# Patient Record
Sex: MALE
[De-identification: names, ages, dates, MRNs, and addresses within clinical notes are randomized per-mention and may not be internally consistent; named-entity substitution may affect disease eponyms.]

---

## 2017-07-25 ENCOUNTER — TRANSCRIPTION ENCOUNTER (OUTPATIENT)
Age: 52
End: 2017-07-25

## 2017-09-17 ENCOUNTER — TRANSCRIPTION ENCOUNTER (OUTPATIENT)
Age: 52
End: 2017-09-17

## 2017-10-13 ENCOUNTER — EMERGENCY (EMERGENCY)
Facility: HOSPITAL | Age: 52
LOS: 1 days | Discharge: LEFT BEFORE DISCHARGE | End: 2017-10-13
Attending: EMERGENCY MEDICINE
Payer: COMMERCIAL

## 2017-10-13 VITALS
DIASTOLIC BLOOD PRESSURE: 92 MMHG | SYSTOLIC BLOOD PRESSURE: 180 MMHG | HEIGHT: 65 IN | HEART RATE: 134 BPM | WEIGHT: 175.05 LBS

## 2017-10-13 LAB
ALBUMIN SERPL ELPH-MCNC: 4.4 G/DL — SIGNIFICANT CHANGE UP (ref 3.3–5.2)
ALP SERPL-CCNC: 70 U/L — SIGNIFICANT CHANGE UP (ref 40–120)
ALT FLD-CCNC: 13 U/L — SIGNIFICANT CHANGE UP
ANION GAP SERPL CALC-SCNC: 19 MMOL/L — HIGH (ref 5–17)
APAP SERPL-MCNC: <7.5 UG/ML — LOW (ref 10–26)
APTT BLD: 27.2 SEC — LOW (ref 27.5–37.4)
AST SERPL-CCNC: 20 U/L — SIGNIFICANT CHANGE UP
BASOPHILS # BLD AUTO: 0 K/UL — SIGNIFICANT CHANGE UP (ref 0–0.2)
BASOPHILS NFR BLD AUTO: 0.3 % — SIGNIFICANT CHANGE UP (ref 0–2)
BILIRUB SERPL-MCNC: 0.2 MG/DL — LOW (ref 0.4–2)
BLD GP AB SCN SERPL QL: SIGNIFICANT CHANGE UP
BUN SERPL-MCNC: 13 MG/DL — SIGNIFICANT CHANGE UP (ref 8–20)
CALCIUM SERPL-MCNC: 8.6 MG/DL — SIGNIFICANT CHANGE UP (ref 8.6–10.2)
CHLORIDE SERPL-SCNC: 101 MMOL/L — SIGNIFICANT CHANGE UP (ref 98–107)
CO2 SERPL-SCNC: 24 MMOL/L — SIGNIFICANT CHANGE UP (ref 22–29)
CREAT SERPL-MCNC: 0.76 MG/DL — SIGNIFICANT CHANGE UP (ref 0.5–1.3)
EOSINOPHIL # BLD AUTO: 0 K/UL — SIGNIFICANT CHANGE UP (ref 0–0.5)
EOSINOPHIL NFR BLD AUTO: 0.7 % — SIGNIFICANT CHANGE UP (ref 0–5)
ETHANOL SERPL-MCNC: 310 MG/DL — SIGNIFICANT CHANGE UP
GLUCOSE SERPL-MCNC: 132 MG/DL — HIGH (ref 70–115)
HCT VFR BLD CALC: 43.8 % — SIGNIFICANT CHANGE UP (ref 42–52)
HGB BLD-MCNC: 14.8 G/DL — SIGNIFICANT CHANGE UP (ref 14–18)
INR BLD: 1.05 RATIO — SIGNIFICANT CHANGE UP (ref 0.88–1.16)
LACTATE BLDV-MCNC: 2.9 MMOL/L — HIGH (ref 0.5–2)
LACTATE BLDV-MCNC: 5.1 MMOL/L — CRITICAL HIGH (ref 0.5–2)
LYMPHOCYTES # BLD AUTO: 1.5 K/UL — SIGNIFICANT CHANGE UP (ref 1–4.8)
LYMPHOCYTES # BLD AUTO: 22 % — SIGNIFICANT CHANGE UP (ref 20–55)
MCHC RBC-ENTMCNC: 32.2 PG — HIGH (ref 27–31)
MCHC RBC-ENTMCNC: 33.8 G/DL — SIGNIFICANT CHANGE UP (ref 32–36)
MCV RBC AUTO: 95.4 FL — HIGH (ref 80–94)
MONOCYTES # BLD AUTO: 0.3 K/UL — SIGNIFICANT CHANGE UP (ref 0–0.8)
MONOCYTES NFR BLD AUTO: 4.8 % — SIGNIFICANT CHANGE UP (ref 3–10)
NEUTROPHILS # BLD AUTO: 4.8 K/UL — SIGNIFICANT CHANGE UP (ref 1.8–8)
NEUTROPHILS NFR BLD AUTO: 70.9 % — SIGNIFICANT CHANGE UP (ref 37–73)
PLATELET # BLD AUTO: 262 K/UL — SIGNIFICANT CHANGE UP (ref 150–400)
POTASSIUM SERPL-MCNC: 4 MMOL/L — SIGNIFICANT CHANGE UP (ref 3.5–5.3)
POTASSIUM SERPL-SCNC: 4 MMOL/L — SIGNIFICANT CHANGE UP (ref 3.5–5.3)
PROT SERPL-MCNC: 7.5 G/DL — SIGNIFICANT CHANGE UP (ref 6.6–8.7)
PROTHROM AB SERPL-ACNC: 11.6 SEC — SIGNIFICANT CHANGE UP (ref 9.8–12.7)
RBC # BLD: 4.59 M/UL — LOW (ref 4.6–6.2)
RBC # FLD: 13.3 % — SIGNIFICANT CHANGE UP (ref 11–15.6)
SALICYLATES SERPL-MCNC: <2 MG/DL — LOW (ref 10–20)
SODIUM SERPL-SCNC: 144 MMOL/L — SIGNIFICANT CHANGE UP (ref 135–145)
TYPE + AB SCN PNL BLD: SIGNIFICANT CHANGE UP
WBC # BLD: 6.7 K/UL — SIGNIFICANT CHANGE UP (ref 4.8–10.8)
WBC # FLD AUTO: 6.7 K/UL — SIGNIFICANT CHANGE UP (ref 4.8–10.8)

## 2017-10-13 PROCEDURE — 71045 X-RAY EXAM CHEST 1 VIEW: CPT

## 2017-10-13 PROCEDURE — 99285 EMERGENCY DEPT VISIT HI MDM: CPT | Mod: 25

## 2017-10-13 PROCEDURE — 36415 COLL VENOUS BLD VENIPUNCTURE: CPT

## 2017-10-13 PROCEDURE — 83605 ASSAY OF LACTIC ACID: CPT

## 2017-10-13 PROCEDURE — 71260 CT THORAX DX C+: CPT

## 2017-10-13 PROCEDURE — 71010: CPT | Mod: 26

## 2017-10-13 PROCEDURE — 72125 CT NECK SPINE W/O DYE: CPT

## 2017-10-13 PROCEDURE — 86900 BLOOD TYPING SEROLOGIC ABO: CPT

## 2017-10-13 PROCEDURE — 72125 CT NECK SPINE W/O DYE: CPT | Mod: 26

## 2017-10-13 PROCEDURE — 85730 THROMBOPLASTIN TIME PARTIAL: CPT

## 2017-10-13 PROCEDURE — 71260 CT THORAX DX C+: CPT | Mod: 26

## 2017-10-13 PROCEDURE — 80053 COMPREHEN METABOLIC PANEL: CPT

## 2017-10-13 PROCEDURE — 85027 COMPLETE CBC AUTOMATED: CPT

## 2017-10-13 PROCEDURE — 85610 PROTHROMBIN TIME: CPT

## 2017-10-13 PROCEDURE — 99291 CRITICAL CARE FIRST HOUR: CPT

## 2017-10-13 PROCEDURE — 74177 CT ABD & PELVIS W/CONTRAST: CPT | Mod: 26

## 2017-10-13 PROCEDURE — 70450 CT HEAD/BRAIN W/O DYE: CPT

## 2017-10-13 PROCEDURE — 70450 CT HEAD/BRAIN W/O DYE: CPT | Mod: 26

## 2017-10-13 PROCEDURE — 74177 CT ABD & PELVIS W/CONTRAST: CPT

## 2017-10-13 PROCEDURE — 82962 GLUCOSE BLOOD TEST: CPT

## 2017-10-13 PROCEDURE — 86901 BLOOD TYPING SEROLOGIC RH(D): CPT

## 2017-10-13 PROCEDURE — 86850 RBC ANTIBODY SCREEN: CPT

## 2017-10-13 PROCEDURE — 80307 DRUG TEST PRSMV CHEM ANLYZR: CPT

## 2017-10-13 RX ORDER — SODIUM CHLORIDE 9 MG/ML
1000 INJECTION INTRAMUSCULAR; INTRAVENOUS; SUBCUTANEOUS ONCE
Refills: 0 | Status: COMPLETED | OUTPATIENT
Start: 2017-10-13 | End: 2017-10-13

## 2017-10-13 RX ORDER — SODIUM CHLORIDE 9 MG/ML
1000 INJECTION, SOLUTION INTRAVENOUS ONCE
Refills: 0 | Status: COMPLETED | OUTPATIENT
Start: 2017-10-13 | End: 2017-10-13

## 2017-10-13 RX ADMIN — SODIUM CHLORIDE 6000 MILLILITER(S): 9 INJECTION, SOLUTION INTRAVENOUS at 13:40

## 2017-10-13 RX ADMIN — SODIUM CHLORIDE 2000 MILLILITER(S): 9 INJECTION INTRAMUSCULAR; INTRAVENOUS; SUBCUTANEOUS at 13:40
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
## 2017-10-13 NOTE — ED PROVIDER NOTE - MEDICAL DECISION MAKING DETAILS
ama signed pt ambulating in nand wife coming to  informed need amdision furth neuro checks pt adamantly refusing please see ama documentation

## 2017-10-13 NOTE — H&P ADULT - NSHPPHYSICALEXAM_GEN_ALL_CORE
A- Patent  B- Non labored, equal bilateral chest rise  C- 2+ femoral and pedal pulses, no external hemorrhage  D- GCS 15, non focal, amnestic  E- Completed      Gen- NAD  Eyes- PERRLA  HENT- NC, abrasion to left forehead, trachea midline  CAD- Regular, tachy  Pulm- CTA BL, chest wall NT  Abd- Obese, NT  Back- no C,T,L,S tenderness or deformity  Ext- Normal ROM, abrasions to arms

## 2018-02-01 NOTE — ED PROVIDER NOTE - NS ED MD EM SELECTION
Evonne Valencia discharged to home accompanied by .   Patient provided with the following educational materials upon discharge:  AVS.   Valuables and belongings sent with patient.   discharge summary, discharge instructions, medications and follow up appointments reviewed with patient and .  Patient and  verbalized understanding   [id number] Comprehensive

## 2019-11-16 ENCOUNTER — EMERGENCY (EMERGENCY)
Facility: HOSPITAL | Age: 54
LOS: 1 days | Discharge: HOME | End: 2019-11-16
Attending: STUDENT IN AN ORGANIZED HEALTH CARE EDUCATION/TRAINING PROGRAM
Payer: COMMERCIAL

## 2019-11-16 VITALS
HEART RATE: 86 BPM | TEMPERATURE: 99 F | HEIGHT: 67 IN | OXYGEN SATURATION: 100 % | RESPIRATION RATE: 20 BRPM | DIASTOLIC BLOOD PRESSURE: 87 MMHG | WEIGHT: 279.99 LBS | SYSTOLIC BLOOD PRESSURE: 120 MMHG

## 2019-11-16 DIAGNOSIS — F10.20 ALCOHOL DEPENDENCE, UNCOMPLICATED: ICD-10-CM

## 2019-11-16 DIAGNOSIS — R69 ILLNESS, UNSPECIFIED: ICD-10-CM

## 2019-11-16 LAB
AMPHET UR-MCNC: NEGATIVE — SIGNIFICANT CHANGE UP
ANION GAP SERPL CALC-SCNC: 18 MMOL/L — HIGH (ref 5–17)
ANION GAP SERPL CALC-SCNC: 26 MMOL/L — HIGH (ref 5–17)
APAP SERPL-MCNC: <7.5 UG/ML — LOW (ref 10–26)
APPEARANCE UR: CLEAR — SIGNIFICANT CHANGE UP
BARBITURATES UR SCN-MCNC: NEGATIVE — SIGNIFICANT CHANGE UP
BASOPHILS # BLD AUTO: 0.04 K/UL — SIGNIFICANT CHANGE UP (ref 0–0.2)
BASOPHILS NFR BLD AUTO: 0.5 % — SIGNIFICANT CHANGE UP (ref 0–2)
BENZODIAZ UR-MCNC: NEGATIVE — SIGNIFICANT CHANGE UP
BILIRUB UR-MCNC: NEGATIVE — SIGNIFICANT CHANGE UP
BUN SERPL-MCNC: 11 MG/DL — SIGNIFICANT CHANGE UP (ref 8–20)
BUN SERPL-MCNC: 12 MG/DL — SIGNIFICANT CHANGE UP (ref 8–20)
CALCIUM SERPL-MCNC: 8.2 MG/DL — LOW (ref 8.6–10.2)
CALCIUM SERPL-MCNC: 8.7 MG/DL — SIGNIFICANT CHANGE UP (ref 8.6–10.2)
CHLORIDE SERPL-SCNC: 94 MMOL/L — LOW (ref 98–107)
CHLORIDE SERPL-SCNC: 99 MMOL/L — SIGNIFICANT CHANGE UP (ref 98–107)
CO2 SERPL-SCNC: 18 MMOL/L — LOW (ref 22–29)
CO2 SERPL-SCNC: 21 MMOL/L — LOW (ref 22–29)
COCAINE METAB.OTHER UR-MCNC: NEGATIVE — SIGNIFICANT CHANGE UP
COLOR SPEC: YELLOW — SIGNIFICANT CHANGE UP
CREAT SERPL-MCNC: 0.66 MG/DL — SIGNIFICANT CHANGE UP (ref 0.5–1.3)
CREAT SERPL-MCNC: 0.75 MG/DL — SIGNIFICANT CHANGE UP (ref 0.5–1.3)
DIFF PNL FLD: ABNORMAL
EOSINOPHIL # BLD AUTO: 0 K/UL — SIGNIFICANT CHANGE UP (ref 0–0.5)
EOSINOPHIL NFR BLD AUTO: 0 % — SIGNIFICANT CHANGE UP (ref 0–6)
EPI CELLS # UR: SIGNIFICANT CHANGE UP
ETHANOL SERPL-MCNC: 126 MG/DL — SIGNIFICANT CHANGE UP
GLUCOSE SERPL-MCNC: 115 MG/DL — SIGNIFICANT CHANGE UP (ref 70–115)
GLUCOSE SERPL-MCNC: 115 MG/DL — SIGNIFICANT CHANGE UP (ref 70–115)
GLUCOSE UR QL: NEGATIVE MG/DL — SIGNIFICANT CHANGE UP
HCT VFR BLD CALC: 46.5 % — SIGNIFICANT CHANGE UP (ref 39–50)
HGB BLD-MCNC: 15.3 G/DL — SIGNIFICANT CHANGE UP (ref 13–17)
IMM GRANULOCYTES NFR BLD AUTO: 0.6 % — SIGNIFICANT CHANGE UP (ref 0–1.5)
KETONES UR-MCNC: ABNORMAL
LEUKOCYTE ESTERASE UR-ACNC: NEGATIVE — SIGNIFICANT CHANGE UP
LITHIUM SERPL-MCNC: 0.15 MMOL/L — LOW (ref 0.5–1.5)
LYMPHOCYTES # BLD AUTO: 1.25 K/UL — SIGNIFICANT CHANGE UP (ref 1–3.3)
LYMPHOCYTES # BLD AUTO: 15.1 % — SIGNIFICANT CHANGE UP (ref 13–44)
MCHC RBC-ENTMCNC: 30.3 PG — SIGNIFICANT CHANGE UP (ref 27–34)
MCHC RBC-ENTMCNC: 32.9 GM/DL — SIGNIFICANT CHANGE UP (ref 32–36)
MCV RBC AUTO: 92.1 FL — SIGNIFICANT CHANGE UP (ref 80–100)
METHADONE UR-MCNC: NEGATIVE — SIGNIFICANT CHANGE UP
MONOCYTES # BLD AUTO: 0.4 K/UL — SIGNIFICANT CHANGE UP (ref 0–0.9)
MONOCYTES NFR BLD AUTO: 4.8 % — SIGNIFICANT CHANGE UP (ref 2–14)
NEUTROPHILS # BLD AUTO: 6.56 K/UL — SIGNIFICANT CHANGE UP (ref 1.8–7.4)
NEUTROPHILS NFR BLD AUTO: 79 % — HIGH (ref 43–77)
NITRITE UR-MCNC: NEGATIVE — SIGNIFICANT CHANGE UP
OPIATES UR-MCNC: NEGATIVE — SIGNIFICANT CHANGE UP
PCP SPEC-MCNC: SIGNIFICANT CHANGE UP
PCP UR-MCNC: NEGATIVE — SIGNIFICANT CHANGE UP
PH UR: 5 — SIGNIFICANT CHANGE UP (ref 5–8)
PLATELET # BLD AUTO: 256 K/UL — SIGNIFICANT CHANGE UP (ref 150–400)
POTASSIUM SERPL-MCNC: 3.6 MMOL/L — SIGNIFICANT CHANGE UP (ref 3.5–5.3)
POTASSIUM SERPL-MCNC: 3.8 MMOL/L — SIGNIFICANT CHANGE UP (ref 3.5–5.3)
POTASSIUM SERPL-SCNC: 3.6 MMOL/L — SIGNIFICANT CHANGE UP (ref 3.5–5.3)
POTASSIUM SERPL-SCNC: 3.8 MMOL/L — SIGNIFICANT CHANGE UP (ref 3.5–5.3)
PROT UR-MCNC: 100 MG/DL
RBC # BLD: 5.05 M/UL — SIGNIFICANT CHANGE UP (ref 4.2–5.8)
RBC # FLD: 14.7 % — HIGH (ref 10.3–14.5)
RBC CASTS # UR COMP ASSIST: ABNORMAL /HPF (ref 0–4)
SALICYLATES SERPL-MCNC: <0.6 MG/DL — LOW (ref 10–20)
SODIUM SERPL-SCNC: 138 MMOL/L — SIGNIFICANT CHANGE UP (ref 135–145)
SODIUM SERPL-SCNC: 138 MMOL/L — SIGNIFICANT CHANGE UP (ref 135–145)
SP GR SPEC: 1.02 — SIGNIFICANT CHANGE UP (ref 1.01–1.02)
THC UR QL: NEGATIVE — SIGNIFICANT CHANGE UP
UROBILINOGEN FLD QL: NEGATIVE MG/DL — SIGNIFICANT CHANGE UP
WBC # BLD: 8.3 K/UL — SIGNIFICANT CHANGE UP (ref 3.8–10.5)
WBC # FLD AUTO: 8.3 K/UL — SIGNIFICANT CHANGE UP (ref 3.8–10.5)
WBC UR QL: NEGATIVE — SIGNIFICANT CHANGE UP

## 2019-11-16 PROCEDURE — 90792 PSYCH DIAG EVAL W/MED SRVCS: CPT

## 2019-11-16 PROCEDURE — 93010 ELECTROCARDIOGRAM REPORT: CPT

## 2019-11-16 PROCEDURE — 99285 EMERGENCY DEPT VISIT HI MDM: CPT

## 2019-11-16 RX ORDER — SODIUM CHLORIDE 9 MG/ML
2000 INJECTION INTRAMUSCULAR; INTRAVENOUS; SUBCUTANEOUS ONCE
Refills: 0 | Status: COMPLETED | OUTPATIENT
Start: 2019-11-16 | End: 2019-11-16

## 2019-11-16 RX ORDER — SODIUM CHLORIDE 9 MG/ML
1000 INJECTION, SOLUTION INTRAVENOUS
Refills: 0 | Status: COMPLETED | OUTPATIENT
Start: 2019-11-16 | End: 2019-11-16

## 2019-11-16 RX ADMIN — Medication 100 MILLIGRAM(S): at 17:36

## 2019-11-16 RX ADMIN — SODIUM CHLORIDE 2000 MILLILITER(S): 9 INJECTION INTRAMUSCULAR; INTRAVENOUS; SUBCUTANEOUS at 20:41

## 2019-11-16 RX ADMIN — SODIUM CHLORIDE 1000 MILLILITER(S): 9 INJECTION, SOLUTION INTRAVENOUS at 22:39

## 2019-11-16 RX ADMIN — Medication 1 MILLIGRAM(S): at 20:41

## 2019-11-16 RX ADMIN — SODIUM CHLORIDE 2000 MILLILITER(S): 9 INJECTION INTRAMUSCULAR; INTRAVENOUS; SUBCUTANEOUS at 22:39

## 2019-11-16 RX ADMIN — Medication 75 MILLIGRAM(S): at 22:39

## 2019-11-16 NOTE — ED STATDOCS - PROGRESS NOTE DETAILS
53 y/o M pt with significant PMHx of Depression presents to the ED c/o suicidal thoughts. Pt states that yesterday he had 2 pints of alcohol but unaware of the time, and also adds that he took more than his prescribed dose of Seroquel. Pt states that he is having suicidal thoughts and is feeling depressed. In the past he saw a psychiatrist for depression, but has never had suicidal thoughts until today. SHx: Gastric Sleeve. Pt transferred to main ED for further evaluation by another provider. 53 y/o M pt with significant PMHx of Depression presents to the ED c/o suicidal thoughts. Pt states that yesterday he had 2 pints of alcohol but unaware of the time, and also adds that he took more than his prescribed dose of Seroquel. Pt states that he is having suicidal thoughts and is feeling depressed. In the past he saw a psychiatrist for depression, but has never had suicidal thoughts until today. SHx: Gastric Sleeve. Patient is tachycardic, EKG showed , likely going through withdraw. librium ordered. Pt transferred to main ED for further evaluation by another provider.

## 2019-11-16 NOTE — ED PROVIDER NOTE - OBJECTIVE STATEMENT
Pt is a 53 yo M co alcohol withdrawal and suicidal thoughts.  Pt states that he has a hx of alcoholism and has been drinking heavily for the past month. Pt states that his last drink was yesterday. Pt reports having suicidal thoughts earlier today but denies anything currently. no cp. no sob. no n/v. no other complaints.

## 2019-11-16 NOTE — ED BEHAVIORAL HEALTH ASSESSMENT NOTE - OTHER PAST PSYCHIATRIC HISTORY (INCLUDE DETAILS REGARDING ONSET, COURSE OF ILLNESS, INPATIENT/OUTPATIENT TREATMENT)
[facility] 2 yrs ago for Depression  [facility] for detox

## 2019-11-16 NOTE — ED PROVIDER NOTE - PATIENT PORTAL LINK FT
You can access the FollowMyHealth Patient Portal offered by [facility] by registering at the following website: [email or web address]. By joining [facility]’s FollowMyHealth portal, you will also be able to view your health information using other applications (apps) compatible with our system.

## 2019-11-16 NOTE — ED BEHAVIORAL HEALTH ASSESSMENT NOTE - SUMMARY
54 yowmm, lives with wife, out of work for past year secondary to work comp injury and s/p TKR, now getting ready to return to work on Monday, no formal PPH Depression and Alcohol abuse, no prior psych admissions, SA, in treatment 2 yrs ago at [facility] on SSRI, in detox in past at [facility], [facility] obese, TKR, gastric sleeve in May bib wife after Pt made a suicidal statement yesterday when intoxicated.  Pt denies active or passive SIIP and has futuristic thinking.  Pt has no acute psych condition which require in pt psych admissions and is psychiatrically cleared for discharge.  Recommend detox/rehab for tx of alcohol abuse/dependence.  Pt agreeing to stay overnight and wait to SBIRT in am.  Of note need clarification of prescribed Lithium by internist as made have been ordered in error.  Wife advised to contact provider to confirm.

## 2019-11-16 NOTE — ED ADULT NURSE REASSESSMENT NOTE - NS ED NURSE REASSESS COMMENT FT1
Pt. wife brought belongings back inside. Belongings locked up in . Pt. states he might want to go to detox. Psych NP made aware. Pt. informed he had to wait for social work and SBIRT in the morning to make a decision about detox.

## 2019-11-16 NOTE — ED ADULT NURSE NOTE - NSIMPLEMENTINTERV_GEN_ALL_ED
Implemented All Universal Safety Interventions:  Fraser to call system. Call bell, personal items and telephone within reach. Instruct patient to call for assistance. Room bathroom lighting operational. Non-slip footwear when patient is off stretcher. Physically safe environment: no spills, clutter or unnecessary equipment. Stretcher in lowest position, wheels locked, appropriate side rails in place.

## 2019-11-16 NOTE — ED ADULT TRIAGE NOTE - CHIEF COMPLAINT QUOTE
Patient presents to ER for evaluation, as per wife patient drank alcohol (vodka 2 pints) patient also took Trazadone and Seroquel unknown amount, patient with HX of depression, state having thought about hurting himself, denies HI, patient calm and cooperative.

## 2019-11-16 NOTE — ED PROVIDER NOTE - PROGRESS NOTE DETAILS
AJM: pt medically cleared. feeling improved. seen and cleared by . pt wants rehab/detox. will keep until AM for JETHRO dominguez Pt signed back out to me by Dr. Ji.  Pt no longer wants inpatient detox.  CIWA never higher than 1. will d/c with outpatient resources.

## 2019-11-16 NOTE — ED PROVIDER NOTE - CLINICAL SUMMARY MEDICAL DECISION MAKING FREE TEXT BOX
labs reviewed. Pt with elevated anion gap.  will hydrate and repeat labs.  psych consult pending. Pt signed out to Dr. Ji pending rest of workup.

## 2019-11-16 NOTE — ED ADULT NURSE REASSESSMENT NOTE - NS ED NURSE REASSESS COMMENT FT1
Ti KILPATRICK canceling 1:1 MD Ivan chacko. Ti Psych NP canceling 1:1 MD Love aware. Pt. mental status unchanged. Respirations even and unlabored. Pt. resting comfortably in stretcher. Wife at bedside

## 2019-11-16 NOTE — ED BEHAVIORAL HEALTH ASSESSMENT NOTE - HPI (INCLUDE ILLNESS QUALITY, SEVERITY, DURATION, TIMING, CONTEXT, MODIFYING FACTORS, ASSOCIATED SIGNS AND SYMPTOMS)
54 yowmm, lives with wife, out of work for past year secondary to work comp injury and s/p TKR, now getting ready to return to work on Monday, no formal PPH Depression and Alcohol abuse, no prior psych admissions, SA, in treatment 2 yrs ago at [facility] on SSRI, in detox in past at [facility], [facility] obese, TKR, gastric sleeve in May bib wife after Pt made a suicidal statement yesterday when intoxicated.  Pt arrived to ED with  at 1730.  Pt  reports drinking 2 pints liquor/day for many years and has h/o withdrawal.  Pt reports yesterday he made a suicidal statement and wife brought him to ED for eval.  Pt denies SIIP and only compliant is anxiety and insomnia secondary to worry about returning to work.  Wife reports MD started Pt on Prozac and Lithium for "withdrawals" and wife showed bottle of Lithium which he just started.  Attempted to call pharmacy to confirm if med was in fact Lithium or Librium but pharm is closed and doctor office is closed.  Pt has never been diagnosed with Bipolar in past and there is no family h/o same.  Pt denies depression mood SIIP and states he "likes life".  Pt denies HIIP and no h/o aggression or violence.  Denies AH/delusions and no evidence of parvin or psychosis.  Pt is well related, diaphoretic, sheet are wet, denies tremors or shakes.  Vital sign are increasing and Pt rcd 3 doses of Librium in ED.

## 2019-11-16 NOTE — ED ADULT NURSE NOTE - OBJECTIVE STATEMENT
Assumed pt. care at 1715. Pt. came with 1:1 yellow gown. Pt. wife put clothing in car. Pt. states he drank 2 pints of vodka yesterday. Pt. wife states that he took Seroquel and trazadone, but his primary care took him off of it two days ago. Pt. wife states she think he took multiple of those pills yesterday. Pt. wife states the primary doctor put him on lithium instead. Pt. states hes in AA for two years but drinks almost everyday. Pt. MANUELWA score 1.

## 2019-11-16 NOTE — ED ADULT NURSE NOTE - NS ED NURSE DC INFO COMPLEXITY
, + strep throat, will give amox and send prescription pt tolerated po ATTD: Dr. Pascual - RSV neg, no acute path on X-ray, + strep. started on abx. given young age and no other existing co morbidities, with improved vitals, will send home with abx and follow with pmd. return if worsens or persists. Simple: Patient demonstrates quick and easy understanding ATTD: Dr. Pascual - RSV neg, no acute path on X-ray, + strep. started on abx. given young age and no other existing co morbidities, with improved vitals, will  transfer to obs for continued monitoring / ivf hydration.

## 2019-11-17 VITALS
OXYGEN SATURATION: 98 % | HEART RATE: 88 BPM | RESPIRATION RATE: 18 BRPM | SYSTOLIC BLOOD PRESSURE: 150 MMHG | TEMPERATURE: 98 F | DIASTOLIC BLOOD PRESSURE: 88 MMHG

## 2019-11-17 PROCEDURE — 96374 THER/PROPH/DIAG INJ IV PUSH: CPT

## 2019-11-17 PROCEDURE — 93005 ELECTROCARDIOGRAM TRACING: CPT

## 2019-11-17 PROCEDURE — 96376 TX/PRO/DX INJ SAME DRUG ADON: CPT

## 2019-11-17 PROCEDURE — 36415 COLL VENOUS BLD VENIPUNCTURE: CPT

## 2019-11-17 PROCEDURE — 80307 DRUG TEST PRSMV CHEM ANLYZR: CPT

## 2019-11-17 PROCEDURE — 99284 EMERGENCY DEPT VISIT MOD MDM: CPT | Mod: 25

## 2019-11-17 PROCEDURE — 96375 TX/PRO/DX INJ NEW DRUG ADDON: CPT

## 2019-11-17 PROCEDURE — 96361 HYDRATE IV INFUSION ADD-ON: CPT

## 2019-11-17 PROCEDURE — 80178 ASSAY OF LITHIUM: CPT

## 2019-11-17 PROCEDURE — 81001 URINALYSIS AUTO W/SCOPE: CPT

## 2019-11-17 PROCEDURE — 85027 COMPLETE CBC AUTOMATED: CPT

## 2019-11-17 PROCEDURE — 80048 BASIC METABOLIC PNL TOTAL CA: CPT

## 2019-11-17 RX ORDER — ACETAMINOPHEN 500 MG
975 TABLET ORAL ONCE
Refills: 0 | Status: COMPLETED | OUTPATIENT
Start: 2019-11-17 | End: 2019-11-17

## 2019-11-17 RX ADMIN — Medication 2 MILLIGRAM(S): at 07:14

## 2019-11-17 RX ADMIN — Medication 2 MILLIGRAM(S): at 00:48

## 2019-11-17 NOTE — ED ADULT NURSE REASSESSMENT NOTE - NS ED NURSE REASSESS COMMENT FT1
Report received from offgoing RN, charting as noted. Patient is A&Ox4, denies any pain or discomfort. Patient is anxious, denies SI at this time. Patient is awaiting SBIRT in the am.  Call bell within reach, stretcher in lowest position, wheels locked.

## 2019-11-17 NOTE — CHART NOTE - NSCHARTNOTEFT_GEN_A_CORE
SW met with patient at bedside to discuss discharge to detox. As per SHONNA Luke and  assessment patient is treat and release, then to be referred to detox facility. Patient reported he does not want to attend detox. SW asked patient about history of depression. Patient stated his depression is related to his drinking. Patient denies history of suicidal ideation. Patient denies current suicidal ideation. Patient reported he wants to go home and will be attending an Alcoholics Anonymous meeting this afternoon. Patient reported he previously attended [facility] outpatient for treatment and found counseling helpful. SW gave patient Substance Abuse Resource List and Community Resource List. No further SW needs indicated.

## 2019-11-17 NOTE — ED ADULT NURSE REASSESSMENT NOTE - GENERAL PATIENT STATE
comfortable appearance/cooperative/resting/sleeping
anxious/comfortable appearance/family/SO at bedside

## 2019-11-17 NOTE — ED ADULT NURSE REASSESSMENT NOTE - NS ED NURSE REASSESS COMMENT FT1
Patient received at this time awake and alert x4 in cart.  Patient c/o anxiety.  Patient was seen by SW and Psych in ED.  Patient is awaiting dispo plan.  Patient has no labored breathing, is able to ambulate with no assist or difficulty, and is in no acute distress.

## 2021-01-25 NOTE — ED ADULT NURSE NOTE - NS_BH TRG Q4YES_ED_ALL_ED
01/26/21        Jennifer Cortez  [address] [place] [address]    Dear Jennifer:    Listed below are your recent clinic laboratory results and recommendations.  Component      Latest Ref Rng & Units 1/22/2021 1/22/2021           8:23 AM  8:23 AM   Fasting Status      Hours 16 16   Sodium      135 - 145 mmol/L 142    Potassium      3.4 - 5.1 mmol/L 4.3    Chloride      98 - 107 mmol/L 108 (H)    CO2      21 - 32 mmol/L 25    ANION GAP      10 - 20 mmol/L 13    Glucose      65 - 99 mg/dL 115 (H)    BUN      6 - 20 mg/dL 12    Creatinine      0.51 - 0.95 mg/dL 0.91    Glomerular Filtration Rate      >90 mL/min/1.73m2 63 (L)    BUN/CREATININE RATIO      7 - 25 13    CALCIUM      8.4 - 10.2 mg/dL 9.2    TOTAL BILIRUBIN      0.2 - 1.0 mg/dL 0.5    AST/SGOT      <=37 Units/L 19    ALT/SGPT      <64 Units/L 27    ALK PHOSPHATASE      45 - 117 Units/L 85    Albumin      3.6 - 5.1 g/dL 3.8    TOTAL PROTEIN      6.4 - 8.2 g/dL 7.0    GLOBULIN      2.0 - 4.0 g/dL 3.2    A/G Ratio, Serum      1.0 - 2.4 1.2    CHOLESTEROL      <=199 mg/dL  182   TRIGLYCERIDE      <=149 mg/dL  81   HDL      >=50 mg/dL  60   CALCULATED LDL      <=129 mg/dL  106   CALCULATED NON HDL      mg/dL  122   CHOL/HDL      <=4.4  3.0   GLYCOHEMOGLOBIN A1C      4.5 - 5.6 % 6.5 (H)    URIC ACID      2.6 - 5.9 mg/dL 6.0 (H)          Recommendations:  Comments: Glucose and A1c remains elevated, still prediabetes but trending upward to diabetic range. Continue low carb diet, lose weight and exercise  Lipids, showing excellent control. Continue statin  Uric acid is borderline elevated. Continue allopurinol and diet  Creatinine is normal but GFR showing mild decrease in kidney function likely from hypertension. Stay hydrated and avoid NSAIDS like Ibuprofen or Aleve  Electrolytes and LFTs, within normal range     Please contact the clinic for any concerns or questions.  As always your health is our primary concern, and we want to thank you for  choosing [facility] for your care.    Sincerely,      Jorge Baig MD  [address] [place] [id number] [phone]   No

## 2022-12-11 ENCOUNTER — NON-APPOINTMENT (OUTPATIENT)
Age: 57
End: 2022-12-11

## 2025-01-08 PROBLEM — Z00.00 ENCOUNTER FOR PREVENTIVE HEALTH EXAMINATION: Status: ACTIVE | Noted: 2025-01-08

## 2025-01-13 ENCOUNTER — APPOINTMENT (OUTPATIENT)
Dept: PLASTIC SURGERY | Facility: CLINIC | Age: 60
End: 2025-01-13
Payer: MEDICARE

## 2025-01-13 VITALS
HEART RATE: 79 BPM | HEIGHT: 65 IN | BODY MASS INDEX: 29.66 KG/M2 | DIASTOLIC BLOOD PRESSURE: 71 MMHG | WEIGHT: 178 LBS | OXYGEN SATURATION: 99 % | SYSTOLIC BLOOD PRESSURE: 118 MMHG

## 2025-01-13 PROCEDURE — 99204 OFFICE O/P NEW MOD 45 MIN: CPT

## 2025-01-17 ENCOUNTER — NON-APPOINTMENT (OUTPATIENT)
Age: 60
End: 2025-01-17

## 2025-02-19 ENCOUNTER — NON-APPOINTMENT (OUTPATIENT)
Age: 60
End: 2025-02-19

## 2025-02-26 ENCOUNTER — NON-APPOINTMENT (OUTPATIENT)
Age: 60
End: 2025-02-26

## 2025-02-27 ENCOUNTER — APPOINTMENT (OUTPATIENT)
Dept: ORTHOPEDIC SURGERY | Facility: CLINIC | Age: 60
End: 2025-02-27
Payer: MEDICARE

## 2025-02-27 VITALS
WEIGHT: 178 LBS | DIASTOLIC BLOOD PRESSURE: 75 MMHG | BODY MASS INDEX: 29.66 KG/M2 | SYSTOLIC BLOOD PRESSURE: 121 MMHG | HEIGHT: 65 IN

## 2025-02-27 DIAGNOSIS — M19.049 PRIMARY OSTEOARTHRITIS, UNSPECIFIED HAND: ICD-10-CM

## 2025-02-27 DIAGNOSIS — M25.532 PAIN IN LEFT WRIST: ICD-10-CM

## 2025-02-27 DIAGNOSIS — M65.4 RADIAL STYLOID TENOSYNOVITIS [DE QUERVAIN]: ICD-10-CM

## 2025-02-27 PROCEDURE — 20600 DRAIN/INJ JOINT/BURSA W/O US: CPT | Mod: LT

## 2025-02-27 PROCEDURE — 73110 X-RAY EXAM OF WRIST: CPT | Mod: 50

## 2025-02-27 PROCEDURE — 20550 NJX 1 TENDON SHEATH/LIGAMENT: CPT | Mod: 59,LT

## 2025-02-27 PROCEDURE — 99204 OFFICE O/P NEW MOD 45 MIN: CPT | Mod: 25

## 2025-03-07 NOTE — ED BEHAVIORAL HEALTH ASSESSMENT NOTE - SUICIDE PROTECTIVE FACTORS
Spoke with LN- per LN ok for patient to continue taking Allegra with newly prescribed prednisone.     Upon callback assess if patient's itching is under control.    Responsibility to family and others/Identifies reasons for living/Has future plans/Supportive social network of family or friends/Engaged in work or school

## 2025-04-17 ENCOUNTER — APPOINTMENT (OUTPATIENT)
Dept: ORTHOPEDIC SURGERY | Facility: CLINIC | Age: 60
End: 2025-04-17

## 2025-06-12 ENCOUNTER — NON-APPOINTMENT (OUTPATIENT)
Age: 60
End: 2025-06-12

## 2025-07-06 ENCOUNTER — NON-APPOINTMENT (OUTPATIENT)
Age: 60
End: 2025-07-06